# Patient Record
(demographics unavailable — no encounter records)

---

## 2025-05-30 NOTE — ASSESSMENT
[FreeTextEntry1] : Grade 2 mixed internal/external hemorrhoids - not impressive. No grade 3 sx's but does have intermittent BRBPR with BM's. Had colonoscopy in June '24.  No polyps found. Stool trapping in long hair - area clipped.  To consider hair clipper to keep hair shorter in area.   Dietary sheet given again and reviewed Is taking metamuxil gummies and increased water. urged to consider colace and make dietary changes. No current indication for repeat colonoscopy, banding, or surgical treatment of hemorrhoids.

## 2025-05-30 NOTE — HISTORY OF PRESENT ILLNESS
[FreeTextEntry1] : 40 y/o M with PMHx of hemorrhoids here for evaluation of hemorrhoids. About 3 weeks ago he was constipated and hemorrhoids flared up. He had bleeding in toilet and on tissue. Last bleeding was last week.  Bowel movements are daily. Stools are soft. Denies need to strain. Denies pain with BM. Has some discomfort sometimes. Taking fiber supplements.  Using proctoform HC and it is helping. Still feels some swelling post BM.   Last colonoscopy 6/2024. No polyps.

## 2025-05-30 NOTE — PHYSICAL EXAM
[Reduce Spontaneously] : a spontaneously reducible (grade II) [Skin Tags] : residual hemorrhoidal skin tags were noted [Normal] : was normal [None] : there was no rectal mass  [Excoriation] : no perianal excoriation [Fistula] : no fistulas [Wart] : no warts [Ulcer ___ cm] : no ulcers [Tender, Swollen] : nontender, non-swollen [Thrombosed] : that was not thrombosed [Stool Sample Taken] : no stool obtained on rectal exam [Gross Blood] : no gross blood [de-identified] : External hemorrhoids, middle sized x 3.  stool trapped in perianal hair which are long nd numerous (Hair clipped and trapped stool pieces removed).  Digital: WNL tone, no masses. Anoscopy: grade 1-2 internal hemorrhoids, no fissure

## 2025-05-30 NOTE — REVIEW OF SYSTEMS
[Negative] : Heme/Lymph [Fever] : no fever [Chills] : no chills [Feeling Poorly] : not feeling poorly [Feeling Tired] : not feeling tired [Recent Weight Gain (___ Lbs)] : no recent weight gain [Recent Weight Loss (___ Lbs)] : no recent weight loss [Nosebleeds] : no nosebleeds [Nasal Discharge] : no nasal discharge [Sore Throat] : no sore throat [Hoarseness] : no hoarseness [Chest Pain] : no chest pain [Palpitations] : no palpitations [Shortness Of Breath] : no shortness of breath [Wheezing] : no wheezing [Cough] : no cough [SOB on Exertion] : no shortness of breath during exertion [Abdominal Pain] : no abdominal pain [Vomiting] : no vomiting [Constipation] : no constipation [Diarrhea] : no diarrhea [Dysuria] : no dysuria [Incontinence] : no incontinence [Hesitancy] : no urinary hesitancy [Nocturia] : no nocturia [Itching] : no itching [Confused] : no confusion [Convulsions] : no convulsions [Dizziness] : no dizziness [Fainting] : no fainting [Suicidal] : not suicidal [Anxiety] : no anxiety [Depression] : no depression [Easy Bleeding] : no tendency for easy bleeding [Easy Bruising] : no tendency for easy bruising